# Patient Record
Sex: MALE | Race: OTHER | HISPANIC OR LATINO | URBAN - METROPOLITAN AREA
[De-identification: names, ages, dates, MRNs, and addresses within clinical notes are randomized per-mention and may not be internally consistent; named-entity substitution may affect disease eponyms.]

---

## 2018-06-04 ENCOUNTER — EMERGENCY (EMERGENCY)
Facility: HOSPITAL | Age: 63
LOS: 1 days | Discharge: HOME | End: 2018-06-04
Attending: EMERGENCY MEDICINE | Admitting: EMERGENCY MEDICINE
Payer: COMMERCIAL

## 2018-06-04 VITALS
TEMPERATURE: 98 F | HEIGHT: 65 IN | HEART RATE: 56 BPM | OXYGEN SATURATION: 98 % | RESPIRATION RATE: 16 BRPM | SYSTOLIC BLOOD PRESSURE: 114 MMHG | WEIGHT: 182.1 LBS | DIASTOLIC BLOOD PRESSURE: 80 MMHG

## 2018-06-04 VITALS
HEART RATE: 56 BPM | TEMPERATURE: 98 F | DIASTOLIC BLOOD PRESSURE: 73 MMHG | SYSTOLIC BLOOD PRESSURE: 121 MMHG | OXYGEN SATURATION: 98 % | RESPIRATION RATE: 16 BRPM

## 2018-06-04 PROCEDURE — 73130 X-RAY EXAM OF HAND: CPT

## 2018-06-04 PROCEDURE — 90471 IMMUNIZATION ADMIN: CPT

## 2018-06-04 PROCEDURE — 73130 X-RAY EXAM OF HAND: CPT | Mod: 26,LT

## 2018-06-04 PROCEDURE — 99283 EMERGENCY DEPT VISIT LOW MDM: CPT

## 2018-06-04 PROCEDURE — 99283 EMERGENCY DEPT VISIT LOW MDM: CPT | Mod: 25

## 2018-06-04 PROCEDURE — 90715 TDAP VACCINE 7 YRS/> IM: CPT

## 2018-06-04 RX ORDER — TETANUS TOXOID, REDUCED DIPHTHERIA TOXOID AND ACELLULAR PERTUSSIS VACCINE, ADSORBED 5; 2.5; 8; 8; 2.5 [IU]/.5ML; [IU]/.5ML; UG/.5ML; UG/.5ML; UG/.5ML
0.5 SUSPENSION INTRAMUSCULAR ONCE
Qty: 0 | Refills: 0 | Status: COMPLETED | OUTPATIENT
Start: 2018-06-04 | End: 2018-06-04

## 2018-06-04 RX ADMIN — TETANUS TOXOID, REDUCED DIPHTHERIA TOXOID AND ACELLULAR PERTUSSIS VACCINE, ADSORBED 0.5 MILLILITER(S): 5; 2.5; 8; 8; 2.5 SUSPENSION INTRAMUSCULAR at 10:40

## 2018-06-04 RX ADMIN — Medication 1 TABLET(S): at 10:39

## 2018-06-04 NOTE — ED ADULT NURSE REASSESSMENT NOTE - NS ED NURSE REASSESS COMMENT FT1
1000 Xrays done. Wound right hand cleansed and soaked on Betadine/ Sterile water solution at this time.

## 2018-06-04 NOTE — ED PROVIDER NOTE - OBJECTIVE STATEMENT
Pt is a 62 yo male who presents to the ED with a cc of hand injury.  Denies significant past medical history.  Pt reports that today while at work he sustained a puncture wound to his left hand palmar aspect.  Pt is right hand dominant.  He reports pain at the site with bleeding that has been controlled with direct pressure.  Pt denies previous injury to left hand.  Denies numbness or tingling in hand.  His Tetanus is not UTD.  Pt denies all other injuries at this time.

## 2018-06-04 NOTE — ED PROVIDER NOTE - SKIN, MLM
Left hand: palmar aspect medial to 1st digit palmar aspect between 1st and 2nd metacarpal bones puncture wound noted with controlled venous oozing.  Sensation intact FROM of hand and all fingers, cap refill less then 2 seconds, +radial pulse no TTP to wrist, no purulent discharge no cellulitis noted

## 2018-06-04 NOTE — ED PROVIDER NOTE - CARE PLAN
Principal Discharge DX:	Puncture wound  Assessment and plan of treatment:	Return to the ED for any new or worsening symptoms  Take your medication as prescribed  Augmentin 1 tab 2 times a day begin tonight as you had your first dose here   Bacitracin to affected region 2 times a day   Follow up with hand surgery in 1-2 days for a recheck  Secondary Diagnosis:	Hand injury, left, initial encounter

## 2018-06-04 NOTE — ED PROVIDER NOTE - PROGRESS NOTE DETAILS
No acute fracture noted on imaging,  puncture wound extensively cleaned, Tetanus updated.  Pt aware of high risk of infection despite po abx and need for close follow up.  All questions answered.  Strict return precautions provided

## 2018-06-04 NOTE — ED PROVIDER NOTE - PLAN OF CARE
Return to the ED for any new or worsening symptoms  Take your medication as prescribed  Augmentin 1 tab 2 times a day begin tonight as you had your first dose here   Bacitracin to affected region 2 times a day   Follow up with hand surgery in 1-2 days for a recheck

## 2019-08-24 ENCOUNTER — EMERGENCY (EMERGENCY)
Facility: HOSPITAL | Age: 64
LOS: 1 days | Discharge: LEFT BEFORE DISCHARGE | End: 2019-08-24
Attending: EMERGENCY MEDICINE
Payer: COMMERCIAL

## 2019-08-24 VITALS
SYSTOLIC BLOOD PRESSURE: 125 MMHG | WEIGHT: 182.1 LBS | RESPIRATION RATE: 20 BRPM | OXYGEN SATURATION: 99 % | HEIGHT: 65 IN | HEART RATE: 115 BPM | TEMPERATURE: 98 F | DIASTOLIC BLOOD PRESSURE: 72 MMHG

## 2019-08-24 PROCEDURE — 99283 EMERGENCY DEPT VISIT LOW MDM: CPT

## 2019-08-24 RX ORDER — ERYTHROMYCIN BASE 5 MG/GRAM
1 OINTMENT (GRAM) OPHTHALMIC (EYE)
Qty: 1 | Refills: 0
Start: 2019-08-24 | End: 2019-08-28

## 2019-08-24 NOTE — ED STATDOCS - NS_ ATTENDINGSCRIBEDETAILS _ED_A_ED_FT
I, Julio Alexander, performed the initial face to face bedside interview with this patient regarding history of present illness, review of symptoms and relevant past medical, social and family history.  I completed an independent physical examination.  I was the initial provider who evaluated this patient. I have signed out the follow up of any pending tests (i.e. labs, radiological studies) to the ACP.  I have communicated the patient’s plan of care and disposition with the ACP.  The history, relevant review of systems, past medical and surgical history, medical decision making, and physical examination was documented by the scribe in my presence and I attest to the accuracy of the documentation.

## 2019-08-24 NOTE — ED ADULT TRIAGE NOTE - NS ED TRIAGE AVPU SCALE
Alert-The patient is alert, awake and responds to voice. The patient is oriented to time, place, and person. The triage nurse is able to obtain subjective information. Normal

## 2019-08-24 NOTE — ED STATDOCS - PROGRESS NOTE DETAILS
65 y/o M pt with PMHx of bilateral glaucoma presents to the ED c/o right eye redness and pain after being struck in the ey by a tree branch. Eye PEx: Eyelid everted, no FB visualized. Fluorescin staining reveals increased uptake to the lateral aspect of the R cornea, no dendritic lesions seen. CT orbit ordered. Searched for pt multiple times, could not find, called pt phone continued to ring with no answer. Pt eloped.

## 2019-08-24 NOTE — ED STATDOCS - OBJECTIVE STATEMENT
63 y/o M pt with PMHx of bilateral glaucoma presents to the ED c/o right eye redness and pain after being struck in the ey by a tree branch approximately one hour ago. Pt was not wearing glasses or protective eyewear. Pt regularly takes eyedrops for his glaucoma, follows with opthalmology. He states that the branch was small, and snapped off and poked him in the eye. Denies fever, chills, diaphoresis, LOC. NKDA. No further complaints at this time.

## 2019-08-24 NOTE — ED STATDOCS - EYES, MLM
conjunctival hemorrhage to the lateral aspect of right eye, visual fields intact, no erythema around pupil, PERRL, 20/30 right eye, 20/40 left eye

## 2019-08-24 NOTE — ED STATDOCS - CLINICAL SUMMARY MEDICAL DECISION MAKING FREE TEXT BOX
eye trauma, subconjunctival hemorrhage, evaluate for corneal abrasion, discharged with topical ABx, follow with opthalmology in 2 days

## 2020-09-28 NOTE — ED PROVIDER NOTE - TEMPLATE, MLM
----- Message from Annamarie Russell MD sent at 9/27/2020  1:09 PM EDT -----  Wound culture without growth.  No ID referral necessary.  Please notify patient.  Can continue current antibiotic regimen.  I think she should probably stay on antibiotic suppression until her surgery around Thanksgiving.  If she would like to switch to a different drug, I would recommend Keflex 500 mg p.o. twice daily for 1 week then 500 mg daily.    ----- Message -----  From: Lab, Background User  Sent: 9/25/2020   7:00 PM EDT  To: Annamarie Russell MD       General

## 2022-10-11 NOTE — ED ADULT TRIAGE NOTE - NS ED TRIAGE AVPU SCALE
Lab Results   Component Value Date    HGBA1C 7 1 (H) 10/09/2022       Recent Labs     10/10/22  1859 10/10/22  2142 10/11/22  0747 10/11/22  1118   POCGLU 191* 167* 94 174*       Blood Sugar Average: Last 72 hrs:  (P) 159 [id number]     · BG in 40s on arrival to ED, improved without interventions  · Home regimen:  Lantus 12 units q h s , currently on 10 units with stable glucose  · SSI coverage with Accu-Cheks for now  · Hypoglycemia protocol    · Diabetic diet Alert-The patient is alert, awake and responds to voice. The patient is oriented to time, place, and person. The triage nurse is able to obtain subjective information.

## 2023-02-15 ENCOUNTER — OFFICE (OUTPATIENT)
Dept: URBAN - METROPOLITAN AREA CLINIC 94 | Facility: CLINIC | Age: 68
Setting detail: OPHTHALMOLOGY
End: 2023-02-15
Payer: MEDICARE

## 2023-02-15 DIAGNOSIS — H40.1111: ICD-10-CM

## 2023-02-15 DIAGNOSIS — H40.1121: ICD-10-CM

## 2023-02-15 PROCEDURE — 92133 CPTRZD OPH DX IMG PST SGM ON: CPT | Performed by: OPHTHALMOLOGY

## 2023-02-15 PROCEDURE — 92083 EXTENDED VISUAL FIELD XM: CPT | Performed by: OPHTHALMOLOGY

## 2023-02-15 PROCEDURE — 92020 GONIOSCOPY: CPT | Performed by: OPHTHALMOLOGY

## 2023-02-15 PROCEDURE — 92014 COMPRE OPH EXAM EST PT 1/>: CPT | Performed by: OPHTHALMOLOGY

## 2023-02-15 ASSESSMENT — REFRACTION_AUTOREFRACTION
OS_SPHERE: --.75
OS_CYLINDER: -0.50
OD_SPHERE: -0.75
OS_AXIS: 115
OD_AXIS: 086
OD_CYLINDER: -0.25

## 2023-02-15 ASSESSMENT — SPHEQUIV_DERIVED: OD_SPHEQUIV: -0.875

## 2023-02-15 ASSESSMENT — KERATOMETRY
OD_AXISANGLE_DEGREES: 090
OS_K2POWER_DIOPTERS: 41.75
OS_AXISANGLE_DEGREES: 050
OD_K1POWER_DIOPTERS: 41.75
OD_K2POWER_DIOPTERS: 41.75
OS_K1POWER_DIOPTERS: 41.25

## 2023-02-15 ASSESSMENT — TONOMETRY
OS_IOP_MMHG: 17
OD_IOP_MMHG: 17

## 2023-02-15 ASSESSMENT — CONFRONTATIONAL VISUAL FIELD TEST (CVF)
OD_FINDINGS: FULL
OS_FINDINGS: FULL

## 2023-02-15 ASSESSMENT — AXIALLENGTH_DERIVED: OD_AL: 24.6184

## 2023-02-15 ASSESSMENT — PACHYMETRY
OD_CT_UM: 592
OS_CT_UM: 577
OS_CT_CORRECTION: -2
OD_CT_CORRECTION: -4

## 2023-02-15 ASSESSMENT — VISUAL ACUITY
OD_BCVA: 20/40+1
OS_BCVA: 20/30-1

## 2023-06-28 ENCOUNTER — OFFICE (OUTPATIENT)
Dept: URBAN - METROPOLITAN AREA CLINIC 94 | Facility: CLINIC | Age: 68
Setting detail: OPHTHALMOLOGY
End: 2023-06-28
Payer: COMMERCIAL

## 2023-06-28 DIAGNOSIS — H40.1111: ICD-10-CM

## 2023-06-28 DIAGNOSIS — H40.1121: ICD-10-CM

## 2023-06-28 PROCEDURE — 92012 INTRM OPH EXAM EST PATIENT: CPT | Performed by: OPHTHALMOLOGY

## 2023-06-28 PROCEDURE — 92250 FUNDUS PHOTOGRAPHY W/I&R: CPT | Performed by: OPHTHALMOLOGY

## 2023-06-28 ASSESSMENT — CONFRONTATIONAL VISUAL FIELD TEST (CVF)
OS_FINDINGS: FULL
OD_FINDINGS: FULL

## 2023-06-28 ASSESSMENT — KERATOMETRY
OS_K2POWER_DIOPTERS: 41.75
OD_AXISANGLE_DEGREES: 137
OD_K1POWER_DIOPTERS: 41.75
OD_K2POWER_DIOPTERS: 42.00
OS_AXISANGLE_DEGREES: 048
METHOD_AUTO_MANUAL: AUTO
OS_K1POWER_DIOPTERS: 41.00

## 2023-06-28 ASSESSMENT — SPHEQUIV_DERIVED
OS_SPHEQUIV: -1.125
OD_SPHEQUIV: -0.75

## 2023-06-28 ASSESSMENT — REFRACTION_AUTOREFRACTION
OD_CYLINDER: -0.50
OS_AXIS: 119
OS_SPHERE: -0.75
OS_CYLINDER: -0.75
OD_SPHERE: -0.50
OD_AXIS: 086

## 2023-06-28 ASSESSMENT — PACHYMETRY
OS_CT_CORRECTION: -2
OD_CT_CORRECTION: -4
OD_CT_UM: 592
OS_CT_UM: 577

## 2023-06-28 ASSESSMENT — TONOMETRY
OD_IOP_MMHG: 15
OS_IOP_MMHG: 15

## 2023-06-28 ASSESSMENT — VISUAL ACUITY
OD_BCVA: 20/25
OS_BCVA: 20/30

## 2023-06-28 ASSESSMENT — AXIALLENGTH_DERIVED
OD_AL: 24.5158
OS_AL: 24.8771

## 2023-12-28 ENCOUNTER — OFFICE (OUTPATIENT)
Dept: URBAN - METROPOLITAN AREA CLINIC 94 | Facility: CLINIC | Age: 68
Setting detail: OPHTHALMOLOGY
End: 2023-12-28
Payer: MEDICARE

## 2023-12-28 DIAGNOSIS — H40.1111: ICD-10-CM

## 2023-12-28 DIAGNOSIS — H40.1121: ICD-10-CM

## 2023-12-28 PROCEDURE — 92012 INTRM OPH EXAM EST PATIENT: CPT | Performed by: OPHTHALMOLOGY

## 2023-12-28 ASSESSMENT — SPHEQUIV_DERIVED
OD_SPHEQUIV: -1.125
OS_SPHEQUIV: -1

## 2023-12-28 ASSESSMENT — REFRACTION_AUTOREFRACTION
OS_SPHERE: -0.75
OD_SPHERE: -1.00
OS_AXIS: 120
OD_CYLINDER: -0.25
OS_CYLINDER: -0.50
OD_AXIS: 090

## 2023-12-28 ASSESSMENT — CONFRONTATIONAL VISUAL FIELD TEST (CVF)
OD_FINDINGS: FULL
OS_FINDINGS: FULL

## 2024-02-19 ENCOUNTER — EMERGENCY (EMERGENCY)
Facility: HOSPITAL | Age: 69
LOS: 1 days | Discharge: HOME | End: 2024-02-19
Attending: EMERGENCY MEDICINE
Payer: COMMERCIAL

## 2024-02-19 VITALS
WEIGHT: 186.07 LBS | SYSTOLIC BLOOD PRESSURE: 142 MMHG | HEIGHT: 65 IN | OXYGEN SATURATION: 97 % | RESPIRATION RATE: 18 BRPM | DIASTOLIC BLOOD PRESSURE: 82 MMHG | HEART RATE: 68 BPM | TEMPERATURE: 98 F

## 2024-02-19 PROCEDURE — 73562 X-RAY EXAM OF KNEE 3: CPT | Mod: 26,RT

## 2024-02-19 PROCEDURE — 99284 EMERGENCY DEPT VISIT MOD MDM: CPT

## 2024-02-19 PROCEDURE — 99283 EMERGENCY DEPT VISIT LOW MDM: CPT

## 2024-02-19 PROCEDURE — 73562 X-RAY EXAM OF KNEE 3: CPT

## 2024-02-19 RX ORDER — IBUPROFEN 200 MG
600 TABLET ORAL ONCE
Refills: 0 | Status: COMPLETED | OUTPATIENT
Start: 2024-02-19 | End: 2024-02-19

## 2024-02-19 RX ORDER — METHOCARBAMOL 500 MG/1
1500 TABLET, FILM COATED ORAL ONCE
Refills: 0 | Status: COMPLETED | OUTPATIENT
Start: 2024-02-19 | End: 2024-02-19

## 2024-02-19 RX ORDER — LIDOCAINE 4 G/100G
1 CREAM TOPICAL ONCE
Refills: 0 | Status: COMPLETED | OUTPATIENT
Start: 2024-02-19 | End: 2024-02-19

## 2024-02-19 RX ORDER — ACETAMINOPHEN 500 MG
1 TABLET ORAL
Qty: 40 | Refills: 0
Start: 2024-02-19 | End: 2024-02-28

## 2024-02-19 RX ADMIN — METHOCARBAMOL 1500 MILLIGRAM(S): 500 TABLET, FILM COATED ORAL at 11:30

## 2024-02-19 RX ADMIN — Medication 600 MILLIGRAM(S): at 11:32

## 2024-02-19 RX ADMIN — Medication 600 MILLIGRAM(S): at 11:30

## 2024-02-19 RX ADMIN — LIDOCAINE 1 PATCH: 4 CREAM TOPICAL at 11:30

## 2024-02-19 NOTE — ED ADULT NURSE NOTE - OBJECTIVE STATEMENT
pt a&ox3, vss, c/o R knee pain pt in NAD @ this time, respirations even and unlabored, POC discussed w/ patient, will continue to reassess.

## 2024-02-19 NOTE — ED PROVIDER NOTE - PRINCIPAL DIAGNOSIS
Form received at [facility] on 3/18/2021.     Please note that it takes 7-10 business days for completion.     Signed authorization received with form.     Right knee pain

## 2024-02-19 NOTE — ED PROVIDER NOTE - ATTENDING CONTRIBUTION TO CARE
Sherrie FOLEYQF-89-pscm-old male presents with right knee swelling and pain for few days.  Patient had a remote surgery in the right knee post an accident.  And now has pain when he bends it.  No fall no trauma.  Patient is sexually active with single partner.  No penile discharge no eye redness.    Patient is alert well-appearing male, S1-S2 normal regular, bilateral clear breath sounds, abdomen soft nontender, right knee is mildly warm no redness normal extension but painful flexion.  Neuro exam is alert oriented x 3 no focal deficits, skin warm dry good turgor    Plan to do x-ray of the knee and pain control.  X-ray of the knee shows diffuse arthritis no fracture.  Will provide with a knee immobilizer and Ortho follow-up.  Patient also has decreased joint space and was advised to follow-up for potential knee replacement

## 2024-02-19 NOTE — ED PROVIDER NOTE - PATIENT PORTAL LINK FT
You can access the FollowMyHealth Patient Portal offered by [facility] by registering at the following website: [email or web address]. By joining [employer]’s FollowMyHealth portal, you will also be able to view your health information using other applications (apps) compatible with our system.

## 2024-02-19 NOTE — ED PROVIDER NOTE - OBJECTIVE STATEMENT
A 68 yo M with no pmh presents to the ED c/o 2 days R knee pain. Denies trauma, fall, fevers, chills, headache, chest pain, palpitations, shortness of breath, cough, nausea, vomiting, diarrhea, hematuria, dysuria, dark stools, focal neurologic symptoms. Denies sexual activity.

## 2024-04-12 ENCOUNTER — OFFICE (OUTPATIENT)
Dept: URBAN - METROPOLITAN AREA CLINIC 94 | Facility: CLINIC | Age: 69
Setting detail: OPHTHALMOLOGY
End: 2024-04-12
Payer: MEDICARE

## 2024-04-12 DIAGNOSIS — H40.1111: ICD-10-CM

## 2024-04-12 DIAGNOSIS — H25.11: ICD-10-CM

## 2024-04-12 DIAGNOSIS — H40.1121: ICD-10-CM

## 2024-04-12 PROCEDURE — 92083 EXTENDED VISUAL FIELD XM: CPT | Performed by: OPHTHALMOLOGY

## 2024-04-12 PROCEDURE — 92133 CPTRZD OPH DX IMG PST SGM ON: CPT | Performed by: OPHTHALMOLOGY

## 2024-04-12 PROCEDURE — 92020 GONIOSCOPY: CPT | Performed by: OPHTHALMOLOGY

## 2024-04-12 PROCEDURE — 92014 COMPRE OPH EXAM EST PT 1/>: CPT | Performed by: OPHTHALMOLOGY

## 2024-09-11 ENCOUNTER — OFFICE (OUTPATIENT)
Dept: URBAN - METROPOLITAN AREA CLINIC 115 | Facility: CLINIC | Age: 69
Setting detail: OPHTHALMOLOGY
End: 2024-09-11
Payer: MEDICARE

## 2024-09-11 DIAGNOSIS — H25.11: ICD-10-CM

## 2024-09-11 DIAGNOSIS — H40.1111: ICD-10-CM

## 2024-09-11 DIAGNOSIS — H40.1121: ICD-10-CM

## 2024-09-11 PROCEDURE — 92014 COMPRE OPH EXAM EST PT 1/>: CPT | Performed by: OPHTHALMOLOGY

## 2024-09-11 PROCEDURE — 92250 FUNDUS PHOTOGRAPHY W/I&R: CPT | Performed by: OPHTHALMOLOGY

## 2024-09-11 ASSESSMENT — CONFRONTATIONAL VISUAL FIELD TEST (CVF)
OS_FINDINGS: FULL
OD_FINDINGS: FULL

## 2024-09-27 ENCOUNTER — OFFICE (OUTPATIENT)
Dept: URBAN - METROPOLITAN AREA CLINIC 94 | Facility: CLINIC | Age: 69
Setting detail: OPHTHALMOLOGY
End: 2024-09-27
Payer: MEDICARE

## 2024-09-27 DIAGNOSIS — H02.834: ICD-10-CM

## 2024-09-27 DIAGNOSIS — H02.524: ICD-10-CM

## 2024-09-27 DIAGNOSIS — H02.521: ICD-10-CM

## 2024-09-27 DIAGNOSIS — H02.422: ICD-10-CM

## 2024-09-27 DIAGNOSIS — H02.831: ICD-10-CM

## 2024-09-27 DIAGNOSIS — H02.421: ICD-10-CM

## 2024-09-27 PROCEDURE — 92082 INTERMEDIATE VISUAL FIELD XM: CPT | Performed by: OPHTHALMOLOGY

## 2024-09-27 PROCEDURE — 92285 EXTERNAL OCULAR PHOTOGRAPHY: CPT | Performed by: OPHTHALMOLOGY

## 2024-09-27 PROCEDURE — 99214 OFFICE O/P EST MOD 30 MIN: CPT | Performed by: OPHTHALMOLOGY

## 2024-09-27 ASSESSMENT — CONFRONTATIONAL VISUAL FIELD TEST (CVF)
OD_FINDINGS: FULL
OS_FINDINGS: FULL

## 2024-11-04 ENCOUNTER — ASC (OUTPATIENT)
Dept: URBAN - METROPOLITAN AREA SURGERY 8 | Facility: SURGERY | Age: 69
Setting detail: OPHTHALMOLOGY
End: 2024-11-04
Payer: COMMERCIAL

## 2024-11-04 DIAGNOSIS — H04.422: ICD-10-CM

## 2024-11-04 DIAGNOSIS — H02.524: ICD-10-CM

## 2024-11-04 DIAGNOSIS — H02.421: ICD-10-CM

## 2024-11-04 DIAGNOSIS — H02.521: ICD-10-CM

## 2024-11-04 PROCEDURE — 67900 REPAIR BROW DEFECT: CPT | Mod: 50 | Performed by: OPHTHALMOLOGY

## 2024-11-04 PROCEDURE — 67904 REPAIR EYELID DEFECT: CPT | Mod: 50 | Performed by: OPHTHALMOLOGY

## 2024-11-05 ENCOUNTER — RX ONLY (RX ONLY)
Age: 69
End: 2024-11-05

## 2024-11-05 ENCOUNTER — OFFICE (OUTPATIENT)
Dept: URBAN - METROPOLITAN AREA CLINIC 116 | Facility: CLINIC | Age: 69
Setting detail: OPHTHALMOLOGY
End: 2024-11-05
Payer: COMMERCIAL

## 2024-11-05 DIAGNOSIS — H02.421: ICD-10-CM

## 2024-11-05 DIAGNOSIS — H02.831: ICD-10-CM

## 2024-11-05 DIAGNOSIS — H02.422: ICD-10-CM

## 2024-11-05 DIAGNOSIS — H02.524: ICD-10-CM

## 2024-11-05 DIAGNOSIS — H02.834: ICD-10-CM

## 2024-11-05 DIAGNOSIS — H02.521: ICD-10-CM

## 2024-11-05 PROCEDURE — 99024 POSTOP FOLLOW-UP VISIT: CPT | Performed by: PHYSICIAN ASSISTANT

## 2024-11-05 ASSESSMENT — KERATOMETRY
OS_K1POWER_DIOPTERS: 41.25
OD_K1POWER_DIOPTERS: 41.75
OD_K2POWER_DIOPTERS: 42.25
OS_AXISANGLE_DEGREES: 051
METHOD_AUTO_MANUAL: AUTO
OS_K2POWER_DIOPTERS: 41.75
OD_AXISANGLE_DEGREES: 135

## 2024-11-05 ASSESSMENT — REFRACTION_AUTOREFRACTION
OS_CYLINDER: -0.50
OS_SPHERE: -1.25
OD_AXIS: 082
OD_CYLINDER: -0.50
OS_AXIS: 106
OD_SPHERE: -0.50

## 2024-11-05 ASSESSMENT — CONFRONTATIONAL VISUAL FIELD TEST (CVF)
OS_FINDINGS: FULL
OD_FINDINGS: FULL

## 2024-11-05 ASSESSMENT — VISUAL ACUITY
OD_BCVA: 20/50
OS_BCVA: 20/40

## 2024-11-29 ENCOUNTER — OFFICE (OUTPATIENT)
Dept: URBAN - METROPOLITAN AREA CLINIC 94 | Facility: CLINIC | Age: 69
Setting detail: OPHTHALMOLOGY
End: 2024-11-29
Payer: MEDICARE

## 2024-11-29 DIAGNOSIS — H02.422: ICD-10-CM

## 2024-11-29 DIAGNOSIS — H02.421: ICD-10-CM

## 2024-11-29 DIAGNOSIS — H02.524: ICD-10-CM

## 2024-11-29 DIAGNOSIS — H02.831: ICD-10-CM

## 2024-11-29 DIAGNOSIS — H02.521: ICD-10-CM

## 2024-11-29 DIAGNOSIS — H02.834: ICD-10-CM

## 2024-11-29 PROCEDURE — 99024 POSTOP FOLLOW-UP VISIT: CPT | Performed by: REGISTERED NURSE

## 2024-11-29 ASSESSMENT — PACHYMETRY
OS_CT_CORRECTION: -2
OS_CT_UM: 577
OD_CT_UM: 592
OD_CT_CORRECTION: -4

## 2024-11-29 ASSESSMENT — KERATOMETRY
METHOD_AUTO_MANUAL: AUTO
OS_K2POWER_DIOPTERS: 41.75
OD_K2POWER_DIOPTERS: 42.00
OS_AXISANGLE_DEGREES: 045
OS_K1POWER_DIOPTERS: 41.25
OD_AXISANGLE_DEGREES: 120
OD_K1POWER_DIOPTERS: 41.50

## 2024-11-29 ASSESSMENT — REFRACTION_AUTOREFRACTION
OD_SPHERE: -1.00
OS_SPHERE: -1.50
OD_CYLINDER: -0.25
OS_AXIS: 100
OS_CYLINDER: -0.50
OD_AXIS: 085

## 2024-11-29 ASSESSMENT — VISUAL ACUITY
OD_BCVA: 20/40
OS_BCVA: 20/40

## 2024-11-29 ASSESSMENT — CONFRONTATIONAL VISUAL FIELD TEST (CVF)
OS_FINDINGS: FULL
OD_FINDINGS: FULL

## 2025-01-08 ENCOUNTER — OFFICE (OUTPATIENT)
Dept: URBAN - METROPOLITAN AREA CLINIC 115 | Facility: CLINIC | Age: 70
Setting detail: OPHTHALMOLOGY
End: 2025-01-08
Payer: MEDICARE

## 2025-01-08 DIAGNOSIS — H40.1121: ICD-10-CM

## 2025-01-08 DIAGNOSIS — H25.11: ICD-10-CM

## 2025-01-08 DIAGNOSIS — H40.1111: ICD-10-CM

## 2025-01-08 PROCEDURE — 92250 FUNDUS PHOTOGRAPHY W/I&R: CPT | Performed by: OPHTHALMOLOGY

## 2025-01-08 PROCEDURE — 92014 COMPRE OPH EXAM EST PT 1/>: CPT | Mod: 24 | Performed by: OPHTHALMOLOGY

## 2025-01-08 ASSESSMENT — CONFRONTATIONAL VISUAL FIELD TEST (CVF)
OD_FINDINGS: FULL
OS_FINDINGS: FULL

## 2025-01-08 ASSESSMENT — KERATOMETRY
OD_K1POWER_DIOPTERS: 41.50
OS_K2POWER_DIOPTERS: 41.75
OD_K2POWER_DIOPTERS: 42.00
OS_K1POWER_DIOPTERS: 41.25
METHOD_AUTO_MANUAL: AUTO
OS_AXISANGLE_DEGREES: 045
OD_AXISANGLE_DEGREES: 120

## 2025-01-08 ASSESSMENT — REFRACTION_AUTOREFRACTION
OS_AXIS: 107
OD_AXIS: 112
OD_SPHERE: -0.75
OD_CYLINDER: -0.50
OS_SPHERE: -1.00
OS_CYLINDER: -0.50

## 2025-01-08 ASSESSMENT — PACHYMETRY
OD_CT_UM: 592
OD_CT_CORRECTION: -4
OS_CT_UM: 577
OS_CT_CORRECTION: -2

## 2025-01-08 ASSESSMENT — VISUAL ACUITY
OD_BCVA: 20/30-2
OS_BCVA: 20/60

## 2025-02-04 ENCOUNTER — RX ONLY (RX ONLY)
Age: 70
End: 2025-02-04

## 2025-02-04 ENCOUNTER — OFFICE (OUTPATIENT)
Dept: URBAN - METROPOLITAN AREA CLINIC 112 | Facility: CLINIC | Age: 70
Setting detail: OPHTHALMOLOGY
End: 2025-02-04
Payer: COMMERCIAL

## 2025-02-04 DIAGNOSIS — H25.11: ICD-10-CM

## 2025-02-04 DIAGNOSIS — H40.1111: ICD-10-CM

## 2025-02-04 DIAGNOSIS — H40.1121: ICD-10-CM

## 2025-02-04 PROCEDURE — 92012 INTRM OPH EXAM EST PATIENT: CPT | Performed by: OPHTHALMOLOGY

## 2025-02-04 ASSESSMENT — REFRACTION_AUTOREFRACTION
OD_SPHERE: -0.50
OD_CYLINDER: -0.50
OS_AXIS: 101
OS_SPHERE: -0.75
OS_CYLINDER: -1.00
OD_AXIS: 081

## 2025-02-04 ASSESSMENT — KERATOMETRY
OD_AXISANGLE_DEGREES: 120
OD_K2POWER_DIOPTERS: 42.00
OS_K2POWER_DIOPTERS: 41.75
OS_AXISANGLE_DEGREES: 024
METHOD_AUTO_MANUAL: AUTO
OS_K1POWER_DIOPTERS: 41.00
OD_K1POWER_DIOPTERS: 41.50

## 2025-02-04 ASSESSMENT — CONFRONTATIONAL VISUAL FIELD TEST (CVF)
OS_FINDINGS: FULL
OD_FINDINGS: FULL

## 2025-02-04 ASSESSMENT — PACHYMETRY
OD_CT_UM: 592
OS_CT_UM: 577
OS_CT_CORRECTION: -2
OD_CT_CORRECTION: -4

## 2025-02-04 ASSESSMENT — VISUAL ACUITY
OS_BCVA: 20/40-1
OD_BCVA: 20/20

## 2025-02-25 ENCOUNTER — OFFICE (OUTPATIENT)
Dept: URBAN - METROPOLITAN AREA CLINIC 94 | Facility: CLINIC | Age: 70
Setting detail: OPHTHALMOLOGY
End: 2025-02-25
Payer: COMMERCIAL

## 2025-02-25 DIAGNOSIS — H43.812: ICD-10-CM

## 2025-02-25 PROCEDURE — 92012 INTRM OPH EXAM EST PATIENT: CPT | Performed by: OPHTHALMOLOGY

## 2025-02-25 PROCEDURE — 92134 CPTRZ OPH DX IMG PST SGM RTA: CPT | Performed by: OPHTHALMOLOGY

## 2025-02-25 ASSESSMENT — PACHYMETRY
OD_CT_UM: 592
OD_CT_CORRECTION: -4
OS_CT_CORRECTION: -2
OS_CT_UM: 577

## 2025-02-25 ASSESSMENT — REFRACTION_AUTOREFRACTION
OS_AXIS: 101
OD_CYLINDER: -0.50
OS_SPHERE: -0.75
OS_CYLINDER: -1.00
OD_SPHERE: -0.50
OD_AXIS: 081

## 2025-02-25 ASSESSMENT — KERATOMETRY
METHOD_AUTO_MANUAL: AUTO
OD_K1POWER_DIOPTERS: 41.50
OS_AXISANGLE_DEGREES: 024
OD_K2POWER_DIOPTERS: 42.00
OS_K1POWER_DIOPTERS: 41.00
OD_AXISANGLE_DEGREES: 120
OS_K2POWER_DIOPTERS: 41.75

## 2025-02-25 ASSESSMENT — CONFRONTATIONAL VISUAL FIELD TEST (CVF)
OS_FINDINGS: FULL
OD_FINDINGS: FULL

## 2025-02-25 ASSESSMENT — VISUAL ACUITY
OD_BCVA: 20/50
OS_BCVA: 20/50-1

## 2025-03-14 ENCOUNTER — OFFICE (OUTPATIENT)
Dept: URBAN - METROPOLITAN AREA CLINIC 94 | Facility: CLINIC | Age: 70
Setting detail: OPHTHALMOLOGY
End: 2025-03-14
Payer: COMMERCIAL

## 2025-03-14 DIAGNOSIS — H43.812: ICD-10-CM

## 2025-03-14 DIAGNOSIS — H02.521: ICD-10-CM

## 2025-03-14 DIAGNOSIS — H02.423: ICD-10-CM

## 2025-03-14 DIAGNOSIS — H02.524: ICD-10-CM

## 2025-03-14 PROCEDURE — 92012 INTRM OPH EXAM EST PATIENT: CPT | Performed by: OPHTHALMOLOGY

## 2025-03-14 ASSESSMENT — REFRACTION_AUTOREFRACTION
OD_SPHERE: -0.50
OD_CYLINDER: -0.50
OS_CYLINDER: -1.00
OS_SPHERE: -0.75
OS_AXIS: 101
OD_AXIS: 081

## 2025-03-14 ASSESSMENT — PACHYMETRY
OS_CT_CORRECTION: -2
OD_CT_CORRECTION: -4
OD_CT_UM: 592
OS_CT_UM: 577

## 2025-03-14 ASSESSMENT — CONFRONTATIONAL VISUAL FIELD TEST (CVF)
OS_FINDINGS: FULL
OD_FINDINGS: FULL

## 2025-03-14 ASSESSMENT — KERATOMETRY
OS_K1POWER_DIOPTERS: 41.00
OS_AXISANGLE_DEGREES: 024
OD_K2POWER_DIOPTERS: 42.00
OD_K1POWER_DIOPTERS: 41.50
OS_K2POWER_DIOPTERS: 41.75
OD_AXISANGLE_DEGREES: 120
METHOD_AUTO_MANUAL: AUTO

## 2025-03-14 ASSESSMENT — VISUAL ACUITY
OD_BCVA: 20/70
OS_BCVA: 20/80-1

## 2025-03-14 ASSESSMENT — TONOMETRY: OD_IOP_MMHG: 19

## 2025-03-20 ENCOUNTER — EMERGENCY (EMERGENCY)
Facility: HOSPITAL | Age: 70
LOS: 1 days | End: 2025-03-20
Attending: EMERGENCY MEDICINE
Payer: COMMERCIAL

## 2025-03-20 VITALS
RESPIRATION RATE: 20 BRPM | DIASTOLIC BLOOD PRESSURE: 87 MMHG | HEART RATE: 88 BPM | SYSTOLIC BLOOD PRESSURE: 125 MMHG | TEMPERATURE: 98 F | OXYGEN SATURATION: 97 % | WEIGHT: 171.96 LBS

## 2025-03-20 VITALS
OXYGEN SATURATION: 98 % | RESPIRATION RATE: 20 BRPM | HEART RATE: 62 BPM | TEMPERATURE: 98 F | SYSTOLIC BLOOD PRESSURE: 130 MMHG | DIASTOLIC BLOOD PRESSURE: 80 MMHG

## 2025-03-20 LAB
A1C WITH ESTIMATED AVERAGE GLUCOSE RESULT: 10.6 % — HIGH (ref 4–5.6)
ALBUMIN SERPL ELPH-MCNC: 4.6 G/DL — SIGNIFICANT CHANGE UP (ref 3.3–5.2)
ALP SERPL-CCNC: 87 U/L — SIGNIFICANT CHANGE UP (ref 40–120)
ALT FLD-CCNC: 60 U/L — HIGH
ANION GAP SERPL CALC-SCNC: 18 MMOL/L — HIGH (ref 5–17)
APPEARANCE UR: CLEAR — SIGNIFICANT CHANGE UP
AST SERPL-CCNC: 38 U/L — SIGNIFICANT CHANGE UP
BACTERIA # UR AUTO: ABNORMAL /HPF
BASOPHILS # BLD AUTO: 0.03 K/UL — SIGNIFICANT CHANGE UP (ref 0–0.2)
BASOPHILS NFR BLD AUTO: 0.5 % — SIGNIFICANT CHANGE UP (ref 0–2)
BILIRUB SERPL-MCNC: 1.1 MG/DL — SIGNIFICANT CHANGE UP (ref 0.4–2)
BILIRUB UR-MCNC: NEGATIVE — SIGNIFICANT CHANGE UP
BUN SERPL-MCNC: 11.4 MG/DL — SIGNIFICANT CHANGE UP (ref 8–20)
CALCIUM SERPL-MCNC: 9.5 MG/DL — SIGNIFICANT CHANGE UP (ref 8.4–10.5)
CAST: 5 /LPF — HIGH (ref 0–4)
CHLORIDE SERPL-SCNC: 94 MMOL/L — LOW (ref 96–108)
CO2 SERPL-SCNC: 24 MMOL/L — SIGNIFICANT CHANGE UP (ref 22–29)
COLOR SPEC: YELLOW — SIGNIFICANT CHANGE UP
CREAT SERPL-MCNC: 0.87 MG/DL — SIGNIFICANT CHANGE UP (ref 0.5–1.3)
DIFF PNL FLD: NEGATIVE — SIGNIFICANT CHANGE UP
EGFR: 94 ML/MIN/1.73M2 — SIGNIFICANT CHANGE UP
EGFR: 94 ML/MIN/1.73M2 — SIGNIFICANT CHANGE UP
EOSINOPHIL # BLD AUTO: 0.11 K/UL — SIGNIFICANT CHANGE UP (ref 0–0.5)
EOSINOPHIL NFR BLD AUTO: 1.9 % — SIGNIFICANT CHANGE UP (ref 0–6)
ESTIMATED AVERAGE GLUCOSE: 258 MG/DL — HIGH (ref 68–114)
GLUCOSE SERPL-MCNC: 431 MG/DL — HIGH (ref 70–99)
GLUCOSE UR QL: >=1000 MG/DL
HCT VFR BLD CALC: 43.5 % — SIGNIFICANT CHANGE UP (ref 39–50)
HGB BLD-MCNC: 15.9 G/DL — SIGNIFICANT CHANGE UP (ref 13–17)
IMM GRANULOCYTES # BLD AUTO: 0.03 K/UL — SIGNIFICANT CHANGE UP (ref 0–0.07)
IMM GRANULOCYTES NFR BLD AUTO: 0.5 % — SIGNIFICANT CHANGE UP (ref 0–0.9)
KETONES UR-MCNC: 80 MG/DL
LEUKOCYTE ESTERASE UR-ACNC: NEGATIVE — SIGNIFICANT CHANGE UP
LYMPHOCYTES # BLD AUTO: 1.69 K/UL — SIGNIFICANT CHANGE UP (ref 1–3.3)
LYMPHOCYTES NFR BLD AUTO: 29 % — SIGNIFICANT CHANGE UP (ref 13–44)
MCHC RBC-ENTMCNC: 30.5 PG — SIGNIFICANT CHANGE UP (ref 27–34)
MCHC RBC-ENTMCNC: 36.6 G/DL — HIGH (ref 32–36)
MCV RBC AUTO: 83.3 FL — SIGNIFICANT CHANGE UP (ref 80–100)
MONOCYTES # BLD AUTO: 0.49 K/UL — SIGNIFICANT CHANGE UP (ref 0–0.9)
MONOCYTES NFR BLD AUTO: 8.4 % — SIGNIFICANT CHANGE UP (ref 2–14)
NEUTROPHILS # BLD AUTO: 3.47 K/UL — SIGNIFICANT CHANGE UP (ref 1.8–7.4)
NEUTROPHILS NFR BLD AUTO: 59.7 % — SIGNIFICANT CHANGE UP (ref 43–77)
NITRITE UR-MCNC: NEGATIVE — SIGNIFICANT CHANGE UP
NRBC # BLD AUTO: 0 K/UL — SIGNIFICANT CHANGE UP (ref 0–0)
NRBC # FLD: 0 K/UL — SIGNIFICANT CHANGE UP (ref 0–0)
NRBC BLD AUTO-RTO: 0 /100 WBCS — SIGNIFICANT CHANGE UP (ref 0–0)
PH UR: 5.5 — SIGNIFICANT CHANGE UP (ref 5–8)
PLATELET # BLD AUTO: 207 K/UL — SIGNIFICANT CHANGE UP (ref 150–400)
PMV BLD: 10.5 FL — SIGNIFICANT CHANGE UP (ref 7–13)
POTASSIUM SERPL-MCNC: 4.6 MMOL/L — SIGNIFICANT CHANGE UP (ref 3.5–5.3)
POTASSIUM SERPL-SCNC: 4.6 MMOL/L — SIGNIFICANT CHANGE UP (ref 3.5–5.3)
PROT SERPL-MCNC: 7.7 G/DL — SIGNIFICANT CHANGE UP (ref 6.6–8.7)
PROT UR-MCNC: SIGNIFICANT CHANGE UP MG/DL
RBC # BLD: 5.22 M/UL — SIGNIFICANT CHANGE UP (ref 4.2–5.8)
RBC # FLD: 12.2 % — SIGNIFICANT CHANGE UP (ref 10.3–14.5)
RBC CASTS # UR COMP ASSIST: 0 /HPF — SIGNIFICANT CHANGE UP (ref 0–4)
SODIUM SERPL-SCNC: 136 MMOL/L — SIGNIFICANT CHANGE UP (ref 135–145)
SP GR SPEC: >1.03 — HIGH (ref 1–1.03)
SQUAMOUS # UR AUTO: 3 /HPF — SIGNIFICANT CHANGE UP (ref 0–5)
UROBILINOGEN FLD QL: 0.2 MG/DL — SIGNIFICANT CHANGE UP (ref 0.2–1)
WBC # BLD: 5.82 K/UL — SIGNIFICANT CHANGE UP (ref 3.8–10.5)
WBC # FLD AUTO: 5.82 K/UL — SIGNIFICANT CHANGE UP (ref 3.8–10.5)
WBC UR QL: 1 /HPF — SIGNIFICANT CHANGE UP (ref 0–5)

## 2025-03-20 PROCEDURE — 82962 GLUCOSE BLOOD TEST: CPT

## 2025-03-20 PROCEDURE — 71046 X-RAY EXAM CHEST 2 VIEWS: CPT | Mod: 26

## 2025-03-20 PROCEDURE — 99284 EMERGENCY DEPT VISIT MOD MDM: CPT

## 2025-03-20 PROCEDURE — 93005 ELECTROCARDIOGRAM TRACING: CPT

## 2025-03-20 PROCEDURE — 83036 HEMOGLOBIN GLYCOSYLATED A1C: CPT

## 2025-03-20 PROCEDURE — 93010 ELECTROCARDIOGRAM REPORT: CPT

## 2025-03-20 PROCEDURE — 71046 X-RAY EXAM CHEST 2 VIEWS: CPT

## 2025-03-20 PROCEDURE — 36415 COLL VENOUS BLD VENIPUNCTURE: CPT

## 2025-03-20 PROCEDURE — 80053 COMPREHEN METABOLIC PANEL: CPT

## 2025-03-20 PROCEDURE — 87086 URINE CULTURE/COLONY COUNT: CPT

## 2025-03-20 PROCEDURE — 85025 COMPLETE CBC W/AUTO DIFF WBC: CPT

## 2025-03-20 PROCEDURE — 99283 EMERGENCY DEPT VISIT LOW MDM: CPT | Mod: 25

## 2025-03-20 PROCEDURE — 81001 URINALYSIS AUTO W/SCOPE: CPT

## 2025-03-20 RX ORDER — METFORMIN HYDROCHLORIDE 850 MG/1
1 TABLET ORAL
Qty: 28 | Refills: 0
Start: 2025-03-20 | End: 2025-04-02

## 2025-03-20 RX ADMIN — Medication 1000 MILLILITER(S): at 10:45

## 2025-03-20 NOTE — ED ADULT NURSE REASSESSMENT NOTE - NS ED NURSE REASSESS COMMENT FT1
Pt A+Ox4, respirations equal and unlabored on room air. Repeat FS performed at this time, . MD Ulrich aware at this time. Pt left in position of comfort, wheels of stretcher locked and in the lowest position. Call bell within reach.

## 2025-03-20 NOTE — ED ADULT NURSE NOTE - NSFALLRISKINTERV_ED_ALL_ED

## 2025-03-20 NOTE — ED PROVIDER NOTE - PROGRESS NOTE DETAILS
Labs significant for elevated glucose 300s. A1c 10. Patient with likely new onset diabetes. Reports he had a donut prior to ED arrival. Will repeat FS to check if down trending. Discussed findings with patient, further advised to decrease water intake. Will prescribe metformin pending PCP follow up next week for reevaluation.   Malia Romo MD

## 2025-03-20 NOTE — ED PROVIDER NOTE - OBJECTIVE STATEMENT
70 year old male w/noPMHx presents to the ED for eval. Patient with multiple complaints, states over the past month with decreased apetite, increased thirst, urinary frequency especially at night as well as generalized weakness and intermittent palpitations. Drinks ~3-5L of water/juice/tea per day. Further adds he has been losing weight, as per triage 20lbs however during assessment patient reports he's not sure "I just feel lighter". Last meal was yesterday evening. As per wife at bedside eats regularly but in less quantities. Denies any headaches, vision changes, numbness, tingling. Denies fevers, chills, nausea, vomiting, abd pain, hematuria, dysuria, melena, abd pain. Follows up with his PCP regularly. Has a scheduled appt next week. Not currently on any meds or supplements.

## 2025-03-20 NOTE — ED PROVIDER NOTE - CLINICAL SUMMARY MEDICAL DECISION MAKING FREE TEXT BOX
On arrival HD stable, patient well appearing in no acute distress. Unclear etiology, possibly new onset DM vs psychogenic polydipsia given chronicity of symptoms. Advised to reduce water intake. Will obtain blood work, EKG, UA for further eval. If workup negative can be discharged with close PCP follow up.

## 2025-03-20 NOTE — ED PROVIDER NOTE - ATTENDING CONTRIBUTION TO CARE
Pt has a new onset of DM with A1c of 10, Has good follow up arranged with primary care, will also make referral for endocrine. Will start metformin and defer continued management to primary care. ECG shows rbbb with LAFB, no prior for comparison but pt reports he had a cardiology visit and echocardiogram 2 weeks ago that were all reportedly normal.

## 2025-03-20 NOTE — ED ADULT NURSE REASSESSMENT NOTE - NS ED NURSE REASSESS COMMENT FT1
Pt resting in bed, respirations equal and unlabored. No signs of acute distress noted at this time. Pt has IV fluids running. Pt left in position of comfort, wheels of stretcher locked and in the lowest position. Call bell within reach. Family at bedside.

## 2025-03-20 NOTE — ED PROVIDER NOTE - PATIENT PORTAL LINK FT
You can access the FollowMyHealth Patient Portal offered by [facility] by registering at the following website: [email or web address]. By joining [employer]’s FollowMyHealth portal, you will also be able to view your health information using other applications (apps) compatible with our system.

## 2025-03-20 NOTE — ED ADULT NURSE NOTE - OBJECTIVE STATEMENT
Pt A+Ox4, c/o fatigue, weakness, poor PO intake, and palpitations. Pt states that he symptoms have been occurring x 1 month, and have been getting progressively worse. Pt states increased thirst, and that he has been drinking a lot of water. Respirations are equal and unlabored on room air, pt speaking complete coherent sentences. Pt denies dizziness, HA, ABD pain, N/V/D, SOB or CP. Pt left in position of comfort, wheels of stretcher locked and in the lowest position. Call bell within reach. Family at bedside. Pt A+Ox4, c/o fatigue, weakness, poor PO intake, and palpitations. Pt states that he symptoms have been occurring x 1 month, and have been getting progressively worse. Pt states increased thirst, and that he has been drinking a lot of water. Pt states that he lost about 20 lbs in past month. Respirations are equal and unlabored on room air, pt speaking complete coherent sentences. Pt denies dizziness, HA, ABD pain, N/V/D, SOB or CP. Pt left in position of comfort, wheels of stretcher locked and in the lowest position. Call bell within reach. Family at bedside.

## 2025-03-20 NOTE — ED PROVIDER NOTE - CARE PROVIDER_API CALL
Raleigh Gonzalez  Cardiovascular Disease  [address] [facility], [address] [address]  Phone: [phone]  Fax: [phone]  Follow Up Time:

## 2025-03-20 NOTE — ED ADULT TRIAGE NOTE - CHIEF COMPLAINT QUOTE
c/o dec po intake, increased thirst, intermittent palpitations, weakness, 20lb weight loss x 3 weeks. .

## 2025-03-20 NOTE — ED PROVIDER NOTE - DISPOSITION TYPE
DISCHARGE Bilobed Transposition Flap Text: The defect edges were debeveled with a #15 scalpel blade.  Given the location of the defect and the proximity to free margins a bilobed transposition flap was deemed most appropriate.  Using a sterile surgical marker, an appropriate bilobe flap drawn around the defect.    The area thus outlined was incised deep to adipose tissue with a #15 scalpel blade.  The skin margins were undermined to an appropriate distance in all directions utilizing iris scissors.

## 2025-03-20 NOTE — ED ADULT NURSE NOTE - NS ED NURSE RECORD ANOTHER VITAL SIGN
Refill request of stimulant medication. Prescription last filled on 02/05/2020 per INSPECT report.    Yes

## 2025-03-21 LAB
CULTURE RESULTS: SIGNIFICANT CHANGE UP
SPECIMEN SOURCE: SIGNIFICANT CHANGE UP

## 2025-03-27 ENCOUNTER — OFFICE (OUTPATIENT)
Dept: URBAN - METROPOLITAN AREA CLINIC 115 | Facility: CLINIC | Age: 70
Setting detail: OPHTHALMOLOGY
End: 2025-03-27
Payer: MEDICARE

## 2025-03-27 DIAGNOSIS — H40.1111: ICD-10-CM

## 2025-03-27 DIAGNOSIS — H25.11: ICD-10-CM

## 2025-03-27 DIAGNOSIS — H40.1121: ICD-10-CM

## 2025-03-27 PROCEDURE — 92012 INTRM OPH EXAM EST PATIENT: CPT | Performed by: OPHTHALMOLOGY

## 2025-03-27 ASSESSMENT — KERATOMETRY
OD_K1POWER_DIOPTERS: 41.50
OD_AXISANGLE_DEGREES: 120
OS_K1POWER_DIOPTERS: 41.00
OS_K2POWER_DIOPTERS: 41.75
OS_AXISANGLE_DEGREES: 024
OD_K2POWER_DIOPTERS: 42.00
METHOD_AUTO_MANUAL: AUTO

## 2025-03-27 ASSESSMENT — CONFRONTATIONAL VISUAL FIELD TEST (CVF)
OS_FINDINGS: FULL
OD_FINDINGS: FULL

## 2025-03-27 ASSESSMENT — REFRACTION_AUTOREFRACTION
OS_CYLINDER: -0.75
OD_CYLINDER: -0.50
OD_AXIS: 089
OD_SPHERE: --1.00
OS_SPHERE: -1.50
OS_AXIS: 099

## 2025-03-27 ASSESSMENT — VISUAL ACUITY
OS_BCVA: 20/40
OD_BCVA: 20/40-2

## 2025-03-27 ASSESSMENT — TONOMETRY
OS_IOP_MMHG: 20
OD_IOP_MMHG: 20

## 2025-03-27 ASSESSMENT — PACHYMETRY
OD_CT_CORRECTION: -4
OD_CT_UM: 592
OS_CT_UM: 577
OS_CT_CORRECTION: -2

## 2025-05-27 ENCOUNTER — OFFICE (OUTPATIENT)
Dept: URBAN - METROPOLITAN AREA CLINIC 94 | Facility: CLINIC | Age: 70
Setting detail: OPHTHALMOLOGY
End: 2025-05-27
Payer: COMMERCIAL

## 2025-05-27 DIAGNOSIS — H43.812: ICD-10-CM

## 2025-05-27 PROCEDURE — 92012 INTRM OPH EXAM EST PATIENT: CPT | Performed by: OPHTHALMOLOGY

## 2025-05-27 PROCEDURE — 92134 CPTRZ OPH DX IMG PST SGM RTA: CPT | Performed by: OPHTHALMOLOGY

## 2025-05-27 ASSESSMENT — REFRACTION_AUTOREFRACTION
OD_SPHERE: --1.00
OS_CYLINDER: -0.75
OS_SPHERE: -1.50
OS_AXIS: 099
OD_CYLINDER: -0.50
OD_AXIS: 089

## 2025-05-27 ASSESSMENT — CONFRONTATIONAL VISUAL FIELD TEST (CVF)
OD_FINDINGS: FULL
OS_FINDINGS: FULL

## 2025-05-27 ASSESSMENT — VISUAL ACUITY
OS_BCVA: 20/60
OD_BCVA: 20/40-1

## 2025-05-27 ASSESSMENT — PACHYMETRY
OD_CT_UM: 592
OS_CT_CORRECTION: -2
OD_CT_CORRECTION: -4
OS_CT_UM: 577

## 2025-05-27 ASSESSMENT — KERATOMETRY
OS_AXISANGLE_DEGREES: 024
OD_AXISANGLE_DEGREES: 120
METHOD_AUTO_MANUAL: AUTO
OS_K2POWER_DIOPTERS: 41.75
OD_K2POWER_DIOPTERS: 42.00
OS_K1POWER_DIOPTERS: 41.00
OD_K1POWER_DIOPTERS: 41.50

## 2025-05-27 ASSESSMENT — TONOMETRY: OD_IOP_MMHG: 18

## 2025-06-09 ENCOUNTER — OFFICE (OUTPATIENT)
Dept: URBAN - METROPOLITAN AREA CLINIC 94 | Facility: CLINIC | Age: 70
Setting detail: OPHTHALMOLOGY
End: 2025-06-09
Payer: MEDICARE

## 2025-06-09 DIAGNOSIS — Z01.818: ICD-10-CM

## 2025-06-09 DIAGNOSIS — H43.812: ICD-10-CM

## 2025-06-09 PROCEDURE — 99212 OFFICE O/P EST SF 10 MIN: CPT

## 2025-06-13 ENCOUNTER — ASC (OUTPATIENT)
Dept: URBAN - METROPOLITAN AREA SURGERY 8 | Facility: SURGERY | Age: 70
Setting detail: OPHTHALMOLOGY
End: 2025-06-13
Payer: MEDICARE

## 2025-06-13 DIAGNOSIS — H43.812: ICD-10-CM

## 2025-06-13 PROCEDURE — 67036 REMOVAL OF INNER EYE FLUID: CPT | Mod: LT | Performed by: OPHTHALMOLOGY

## 2025-06-17 ENCOUNTER — RX ONLY (RX ONLY)
Age: 70
End: 2025-06-17

## 2025-06-17 ENCOUNTER — OFFICE (OUTPATIENT)
Dept: URBAN - METROPOLITAN AREA CLINIC 94 | Facility: CLINIC | Age: 70
Setting detail: OPHTHALMOLOGY
End: 2025-06-17
Payer: MEDICARE

## 2025-06-17 DIAGNOSIS — H43.812: ICD-10-CM

## 2025-06-17 PROBLEM — Z01.818 ENCOUNTER FOR OTHER PREPROCEDURAL EXAMINATION: Status: ACTIVE | Noted: 2025-06-09

## 2025-06-17 PROCEDURE — 99024 POSTOP FOLLOW-UP VISIT: CPT | Performed by: OPHTHALMOLOGY

## 2025-06-17 ASSESSMENT — TONOMETRY: OD_IOP_MMHG: 14

## 2025-06-17 ASSESSMENT — VISUAL ACUITY
OS_BCVA: 20/25
OD_BCVA: 20/25

## 2025-06-17 ASSESSMENT — PACHYMETRY
OS_CT_CORRECTION: -2
OS_CT_UM: 577
OD_CT_CORRECTION: -4
OD_CT_UM: 592

## 2025-06-17 ASSESSMENT — CONFRONTATIONAL VISUAL FIELD TEST (CVF)
OD_FINDINGS: FULL
OS_FINDINGS: FULL

## 2025-07-08 ENCOUNTER — OFFICE (OUTPATIENT)
Dept: URBAN - METROPOLITAN AREA CLINIC 94 | Facility: CLINIC | Age: 70
Setting detail: OPHTHALMOLOGY
End: 2025-07-08
Payer: MEDICARE

## 2025-07-08 DIAGNOSIS — H40.1131: ICD-10-CM

## 2025-07-08 PROCEDURE — 92012 INTRM OPH EXAM EST PATIENT: CPT | Performed by: PHYSICIAN ASSISTANT

## 2025-07-08 ASSESSMENT — CONFRONTATIONAL VISUAL FIELD TEST (CVF)
OD_FINDINGS: FULL
OS_FINDINGS: FULL

## 2025-07-08 ASSESSMENT — PACHYMETRY
OD_CT_UM: 592
OS_CT_UM: 577
OS_CT_CORRECTION: -2
OD_CT_CORRECTION: -4

## 2025-07-08 ASSESSMENT — TONOMETRY: OD_IOP_MMHG: 21

## 2025-07-09 ASSESSMENT — VISUAL ACUITY
OD_BCVA: 20/40
OS_BCVA: 20/40

## 2025-07-18 ENCOUNTER — OFFICE (OUTPATIENT)
Dept: URBAN - METROPOLITAN AREA CLINIC 94 | Facility: CLINIC | Age: 70
Setting detail: OPHTHALMOLOGY
End: 2025-07-18
Payer: MEDICARE

## 2025-07-18 DIAGNOSIS — H40.1111: ICD-10-CM

## 2025-07-18 DIAGNOSIS — H40.1121: ICD-10-CM

## 2025-07-18 PROCEDURE — 92133 CPTRZD OPH DX IMG PST SGM ON: CPT | Performed by: OPHTHALMOLOGY

## 2025-07-18 PROCEDURE — 92012 INTRM OPH EXAM EST PATIENT: CPT | Performed by: OPHTHALMOLOGY

## 2025-07-18 ASSESSMENT — CONFRONTATIONAL VISUAL FIELD TEST (CVF)
OS_FINDINGS: FULL
OD_FINDINGS: FULL

## 2025-07-18 ASSESSMENT — KERATOMETRY
OD_AXISANGLE_DEGREES: 120
OS_K1POWER_DIOPTERS: 41.50
OS_AXISANGLE_DEGREES: 043
OD_K1POWER_DIOPTERS: 41.75
OD_K2POWER_DIOPTERS: 42.00
OS_K2POWER_DIOPTERS: 41.50

## 2025-07-18 ASSESSMENT — REFRACTION_AUTOREFRACTION
OS_CYLINDER: -0.50
OD_SPHERE: -0.75
OS_AXIS: 092
OD_AXIS: 065
OD_CYLINDER: -0.25
OS_SPHERE: -2.25

## 2025-07-18 ASSESSMENT — TONOMETRY
OD_IOP_MMHG: 18
OS_IOP_MMHG: 18

## 2025-07-18 ASSESSMENT — PACHYMETRY
OD_CT_CORRECTION: -4
OS_CT_CORRECTION: -2
OD_CT_UM: 592
OS_CT_UM: 577

## 2025-07-18 ASSESSMENT — VISUAL ACUITY
OD_BCVA: 20/40
OS_BCVA: 20/25

## 2025-08-05 ENCOUNTER — OFFICE (OUTPATIENT)
Dept: URBAN - METROPOLITAN AREA CLINIC 94 | Facility: CLINIC | Age: 70
Setting detail: OPHTHALMOLOGY
End: 2025-08-05
Payer: MEDICARE

## 2025-08-05 DIAGNOSIS — H43.812: ICD-10-CM

## 2025-08-05 PROCEDURE — 99024 POSTOP FOLLOW-UP VISIT: CPT | Performed by: OPHTHALMOLOGY

## 2025-08-05 ASSESSMENT — REFRACTION_AUTOREFRACTION
OD_CYLINDER: -0.25
OD_SPHERE: -0.75
OS_CYLINDER: -0.50
OS_AXIS: 092
OS_SPHERE: -2.25
OD_AXIS: 065

## 2025-08-05 ASSESSMENT — KERATOMETRY
OD_K1POWER_DIOPTERS: 41.75
OD_K2POWER_DIOPTERS: 42.00
OS_AXISANGLE_DEGREES: 043
OD_AXISANGLE_DEGREES: 120
OS_K2POWER_DIOPTERS: 41.50
OS_K1POWER_DIOPTERS: 41.50

## 2025-08-05 ASSESSMENT — TONOMETRY
OS_IOP_MMHG: 19
OD_IOP_MMHG: 17

## 2025-08-05 ASSESSMENT — VISUAL ACUITY
OS_BCVA: 20/25-1
OD_BCVA: 20/70+1

## 2025-08-05 ASSESSMENT — PACHYMETRY
OS_CT_UM: 577
OD_CT_CORRECTION: -4
OS_CT_CORRECTION: -2
OD_CT_UM: 592

## 2025-08-05 ASSESSMENT — CONFRONTATIONAL VISUAL FIELD TEST (CVF)
OS_FINDINGS: FULL
OD_FINDINGS: FULL

## 2025-08-28 ENCOUNTER — OFFICE (OUTPATIENT)
Dept: URBAN - METROPOLITAN AREA CLINIC 115 | Facility: CLINIC | Age: 70
Setting detail: OPHTHALMOLOGY
End: 2025-08-28
Payer: MEDICARE

## 2025-08-28 DIAGNOSIS — H40.1121: ICD-10-CM

## 2025-08-28 DIAGNOSIS — H40.1111: ICD-10-CM

## 2025-08-28 PROCEDURE — 92020 GONIOSCOPY: CPT | Performed by: OPHTHALMOLOGY

## 2025-08-28 PROCEDURE — 92014 COMPRE OPH EXAM EST PT 1/>: CPT | Performed by: OPHTHALMOLOGY

## 2025-08-28 PROCEDURE — 92083 EXTENDED VISUAL FIELD XM: CPT | Performed by: OPHTHALMOLOGY

## 2025-08-28 ASSESSMENT — REFRACTION_AUTOREFRACTION
OD_SPHERE: -0.50
OD_CYLINDER: -0.50
OS_CYLINDER: -0.75
OS_AXIS: 089
OD_AXIS: 080
OS_SPHERE: -3.00

## 2025-08-28 ASSESSMENT — KERATOMETRY
OS_K1POWER_DIOPTERS: 41.50
OS_K2POWER_DIOPTERS: 41.50
OD_AXISANGLE_DEGREES: 120
OD_K2POWER_DIOPTERS: 42.00
OD_K1POWER_DIOPTERS: 41.75
OS_AXISANGLE_DEGREES: 043

## 2025-08-28 ASSESSMENT — CONFRONTATIONAL VISUAL FIELD TEST (CVF)
OS_FINDINGS: FULL
OD_FINDINGS: FULL

## 2025-08-28 ASSESSMENT — VISUAL ACUITY
OD_BCVA: 20/100
OS_BCVA: 20/25

## 2025-08-28 ASSESSMENT — PACHYMETRY
OD_CT_CORRECTION: -4
OS_CT_UM: 577
OS_CT_CORRECTION: -2
OD_CT_UM: 592

## 2025-08-28 ASSESSMENT — TONOMETRY: OD_IOP_MMHG: 20
